# Patient Record
Sex: FEMALE | Race: BLACK OR AFRICAN AMERICAN | URBAN - METROPOLITAN AREA
[De-identification: names, ages, dates, MRNs, and addresses within clinical notes are randomized per-mention and may not be internally consistent; named-entity substitution may affect disease eponyms.]

---

## 2018-08-22 ENCOUNTER — HISTORICAL (OUTPATIENT)
Dept: OBSTETRICS AND GYNECOLOGY | Facility: HOSPITAL | Age: 28
End: 2018-08-22

## 2018-08-22 LAB
ABS NEUT (OLG): 5.55 X10(3)/MCL (ref 2.1–9.2)
APPEARANCE, UA: NORMAL
BACTERIA SPEC CULT: NORMAL /HPF
BASOPHILS # BLD AUTO: 0 X10(3)/MCL (ref 0–0.2)
BASOPHILS NFR BLD AUTO: 0 %
BILIRUB UR QL STRIP: NORMAL
COLOR UR: NORMAL
EOSINOPHIL # BLD AUTO: 0.1 X10(3)/MCL (ref 0–0.9)
EOSINOPHIL NFR BLD AUTO: 1 %
ERYTHROCYTE [DISTWIDTH] IN BLOOD BY AUTOMATED COUNT: 13.2 % (ref 11.5–17)
GLUCOSE (UA): NORMAL
GROUP & RH: NORMAL
HCT VFR BLD AUTO: 36 % (ref 37–47)
HGB BLD-MCNC: 11.5 GM/DL (ref 12–16)
HGB UR QL STRIP: NORMAL
KETONES UR QL STRIP: NORMAL
LEUKOCYTE ESTERASE UR QL STRIP: NORMAL
LYMPHOCYTES # BLD AUTO: 1.9 X10(3)/MCL (ref 0.6–4.6)
LYMPHOCYTES NFR BLD AUTO: 23 %
MCH RBC QN AUTO: 29.1 PG (ref 27–31)
MCHC RBC AUTO-ENTMCNC: 31.9 GM/DL (ref 33–36)
MCV RBC AUTO: 91.1 FL (ref 80–94)
MONOCYTES # BLD AUTO: 0.7 X10(3)/MCL (ref 0.1–1.3)
MONOCYTES NFR BLD AUTO: 8 %
NEUTROPHILS # BLD AUTO: 5.55 X10(3)/MCL (ref 1.4–7.9)
NEUTROPHILS NFR BLD AUTO: 67 %
NITRITE UR QL STRIP: NORMAL
PH UR STRIP: NORMAL [PH] (ref 5–9)
PLATELET # BLD AUTO: 180 X10(3)/MCL (ref 130–400)
PMV BLD AUTO: 11.4 FL (ref 9.4–12.4)
PROT UR QL STRIP: NORMAL
RBC # BLD AUTO: 3.95 X10(6)/MCL (ref 4.2–5.4)
RBC #/AREA URNS HPF: NORMAL /HPF
SP GR UR STRIP: NORMAL (ref 1–1.03)
SQUAMOUS EPITHELIAL, UA: NORMAL /HPF
UA WBC MAN: NORMAL /HPF
UROBILINOGEN UR STRIP-ACNC: NORMAL
WBC # SPEC AUTO: 8.3 X10(3)/MCL (ref 4.5–11.5)

## 2022-04-30 NOTE — OP NOTE
DATE OF SURGERY:        SURGEON:  Jovany Torres MD    PREOPERATIVE DIAGNOSIS:  17 weeks and 5 days gestation with cervical incompetence.    POSTOPERATIVE DIAGNOSIS:  17 weeks and 5 days gestation with cervical incompetence with shortened cervix, likely old laceration around 7-8 o'clock.    ANESTHESIOLOGIST:  Dustin Roth MD.    ANESTHESIA:  Spinal.    COMPLICATIONS:  None.    OPERATION:  De La Torre cervical cerclage.    DESCRIPTION OF OPERATION:  The patient was prepped and draped in a sterile manner and placed in the lithotomy position for cervical cerclage procedure.  Examination under anesthesia revealed the cervix to be shortened, around 7-8 o'clock.  It looks like a very little cervical tissue left, similar to, likely an old laceration.  Under direct visualization with 2 assistants using retractors, the cervix was visualized and De [place] Torre cervical cerclage suture was placed starting at 11 o'clock and finishing at 1 o'clock using #5 Ethibond suture and going as high up as cervical vaginal junction and including cervical stroma with each suture.  One such suture was placed and tied around 12 o'clock. The patient tolerated the procedure well and preoperative  and postoperative fetal heart rate were normal. Vaginal and rectal examinations were confirmatory.  The patient was given the usual postoperative instructions and will be discharged home once the spinal effect is resolved.        ______________________________  Jovany Torres MD    PKD/UD  DD:  08/22/2018  Time:  04:34PM  DT:  08/22/2018  Time:  04:59PM  Job #:  [id number]    cc: Charu Dahl MD